# Patient Record
Sex: FEMALE | Race: WHITE | URBAN - METROPOLITAN AREA
[De-identification: names, ages, dates, MRNs, and addresses within clinical notes are randomized per-mention and may not be internally consistent; named-entity substitution may affect disease eponyms.]

---
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
## 2018-05-23 NOTE — PROGRESS NOTES
HPI:   Yanira Styles is a 19year old female who presents with her mother for follow up birth control. Was started on apri in January by a provider at her school.  Attends [employer] but is home for the summer; studying English and desires to go sinus pain or sore throat  LUNGS: denies shortness of breath with exertion  CARDIOVASCULAR: denies chest pain on exertion  GI: denies abdominal pain,denies heartburn  : denies dysuria, vaginal discharge or itching, periods regular   MUSCULOSKELETAL: sd

## 2018-05-25 ENCOUNTER — OFFICE VISIT (OUTPATIENT)
Dept: FAMILY MEDICINE CLINIC | Facility: CLINIC | Age: 18
End: 2018-05-25

## 2018-05-25 VITALS
OXYGEN SATURATION: 98 % | BODY MASS INDEX: 21.66 KG/M2 | DIASTOLIC BLOOD PRESSURE: 76 MMHG | WEIGHT: 130 LBS | RESPIRATION RATE: 16 BRPM | HEIGHT: 65 IN | TEMPERATURE: 98 F | HEART RATE: 75 BPM | SYSTOLIC BLOOD PRESSURE: 112 MMHG

## 2018-05-25 DIAGNOSIS — Z30.41 ENCOUNTER FOR SURVEILLANCE OF CONTRACEPTIVE PILLS: Primary | ICD-10-CM

## 2018-05-25 DIAGNOSIS — Z71.85 IMMUNIZATION COUNSELING: ICD-10-CM

## 2018-05-25 PROCEDURE — 99213 OFFICE O/P EST LOW 20 MIN: CPT | Performed by: PHYSICIAN ASSISTANT

## 2018-05-25 RX ORDER — DESOGESTREL AND ETHINYL ESTRADIOL 0.15-0.03
1 KIT ORAL DAILY
Qty: 3 PACKAGE | Refills: 3 | Status: SHIPPED | OUTPATIENT
Start: 2018-05-25 | End: 2020-01-07

## 2018-05-25 RX ORDER — DESOGESTREL AND ETHINYL ESTRADIOL 0.15-0.03
1 KIT ORAL DAILY
COMMUNITY
End: 2018-05-25

## 2018-05-25 RX ORDER — DESOGESTREL AND ETHINYL ESTRADIOL 0.15-0.03
1 KIT ORAL DAILY
Qty: 3 PACKAGE | Refills: 3 | Status: SHIPPED | OUTPATIENT
Start: 2018-05-25 | End: 2018-05-25

## 2019-08-06 ENCOUNTER — OFFICE VISIT (OUTPATIENT)
Dept: FAMILY MEDICINE CLINIC | Facility: CLINIC | Age: 19
End: 2019-08-06
Payer: COMMERCIAL

## 2019-08-06 VITALS
DIASTOLIC BLOOD PRESSURE: 64 MMHG | RESPIRATION RATE: 16 BRPM | TEMPERATURE: 98 F | HEIGHT: 65 IN | WEIGHT: 141 LBS | SYSTOLIC BLOOD PRESSURE: 104 MMHG | HEART RATE: 84 BPM | OXYGEN SATURATION: 98 % | BODY MASS INDEX: 23.49 KG/M2

## 2019-08-06 DIAGNOSIS — L72.3 SEBACEOUS CYST: Primary | ICD-10-CM

## 2019-08-06 PROCEDURE — 99213 OFFICE O/P EST LOW 20 MIN: CPT | Performed by: FAMILY MEDICINE

## 2019-08-06 NOTE — PROGRESS NOTES
HPI:   Sedrick Perez is a 22year old female who presents with skin concerns  Pt has a skin lesion to left shoulder  Getting bigger   No drainage  Pt has h/o keloids   No facial acne       Current Outpatient Medications:  APRI 0.15-30 MG-MCG Oral Tab Take DERM - INTERNAL      Questions answered and patient indicates understanding of these issues and agrees to the plan. Follow up in 6-12 mo or sooner if needed.

## 2025-03-14 ENCOUNTER — LAB ENCOUNTER (OUTPATIENT)
Dept: LAB | Age: 25
End: 2025-03-14
Attending: FAMILY MEDICINE
Payer: COMMERCIAL

## 2025-03-14 ENCOUNTER — OFFICE VISIT (OUTPATIENT)
Dept: FAMILY MEDICINE CLINIC | Facility: CLINIC | Age: 25
End: 2025-03-14
Payer: COMMERCIAL

## 2025-03-14 VITALS
RESPIRATION RATE: 16 BRPM | SYSTOLIC BLOOD PRESSURE: 122 MMHG | DIASTOLIC BLOOD PRESSURE: 68 MMHG | OXYGEN SATURATION: 98 % | WEIGHT: 165 LBS | HEIGHT: 66 IN | BODY MASS INDEX: 26.52 KG/M2 | HEART RATE: 78 BPM

## 2025-03-14 DIAGNOSIS — R53.83 OTHER FATIGUE: ICD-10-CM

## 2025-03-14 DIAGNOSIS — M26.629 ARTHRALGIA OF TEMPOROMANDIBULAR JOINT, UNSPECIFIED LATERALITY: Primary | ICD-10-CM

## 2025-03-14 DIAGNOSIS — M25.50 MULTIPLE JOINT PAIN: ICD-10-CM

## 2025-03-14 LAB
ALBUMIN SERPL-MCNC: 5.3 G/DL (ref 3.2–4.8)
ALBUMIN/GLOB SERPL: 1.8 {RATIO} (ref 1–2)
ALP LIVER SERPL-CCNC: 108 U/L
ALT SERPL-CCNC: 25 U/L
ANION GAP SERPL CALC-SCNC: 9 MMOL/L (ref 0–18)
AST SERPL-CCNC: 19 U/L (ref ?–34)
BASOPHILS # BLD AUTO: 0.06 X10(3) UL (ref 0–0.2)
BASOPHILS NFR BLD AUTO: 0.7 %
BILIRUB SERPL-MCNC: 0.6 MG/DL (ref 0.3–1.2)
BUN BLD-MCNC: 13 MG/DL (ref 9–23)
CALCIUM BLD-MCNC: 10.1 MG/DL (ref 8.7–10.6)
CHLORIDE SERPL-SCNC: 103 MMOL/L (ref 98–112)
CO2 SERPL-SCNC: 28 MMOL/L (ref 21–32)
CREAT BLD-MCNC: 1.09 MG/DL
CRP SERPL-MCNC: <0.4 MG/DL (ref ?–0.5)
EGFRCR SERPLBLD CKD-EPI 2021: 72 ML/MIN/1.73M2 (ref 60–?)
EOSINOPHIL # BLD AUTO: 0.19 X10(3) UL (ref 0–0.7)
EOSINOPHIL NFR BLD AUTO: 2.1 %
ERYTHROCYTE [DISTWIDTH] IN BLOOD BY AUTOMATED COUNT: 11.9 %
ERYTHROCYTE [SEDIMENTATION RATE] IN BLOOD: 19 MM/HR
FASTING STATUS PATIENT QL REPORTED: NO
GLOBULIN PLAS-MCNC: 2.9 G/DL (ref 2–3.5)
GLUCOSE BLD-MCNC: 75 MG/DL (ref 70–99)
HCT VFR BLD AUTO: 42.9 %
HGB BLD-MCNC: 14.2 G/DL
IMM GRANULOCYTES # BLD AUTO: 0.04 X10(3) UL (ref 0–1)
IMM GRANULOCYTES NFR BLD: 0.5 %
LYMPHOCYTES # BLD AUTO: 2.29 X10(3) UL (ref 1–4)
LYMPHOCYTES NFR BLD AUTO: 25.8 %
MCH RBC QN AUTO: 30 PG (ref 26–34)
MCHC RBC AUTO-ENTMCNC: 33.1 G/DL (ref 31–37)
MCV RBC AUTO: 90.5 FL
MONOCYTES # BLD AUTO: 0.41 X10(3) UL (ref 0.1–1)
MONOCYTES NFR BLD AUTO: 4.6 %
NEUTROPHILS # BLD AUTO: 5.88 X10 (3) UL (ref 1.5–7.7)
NEUTROPHILS # BLD AUTO: 5.88 X10(3) UL (ref 1.5–7.7)
NEUTROPHILS NFR BLD AUTO: 66.3 %
OSMOLALITY SERPL CALC.SUM OF ELEC: 289 MOSM/KG (ref 275–295)
PLATELET # BLD AUTO: 364 10(3)UL (ref 150–450)
POTASSIUM SERPL-SCNC: 3.8 MMOL/L (ref 3.5–5.1)
PROT SERPL-MCNC: 8.2 G/DL (ref 5.7–8.2)
RBC # BLD AUTO: 4.74 X10(6)UL
SODIUM SERPL-SCNC: 140 MMOL/L (ref 136–145)
WBC # BLD AUTO: 8.9 X10(3) UL (ref 4–11)

## 2025-03-14 PROCEDURE — 99203 OFFICE O/P NEW LOW 30 MIN: CPT | Performed by: FAMILY MEDICINE

## 2025-03-14 PROCEDURE — 85652 RBC SED RATE AUTOMATED: CPT | Performed by: FAMILY MEDICINE

## 2025-03-14 PROCEDURE — 85025 COMPLETE CBC W/AUTO DIFF WBC: CPT | Performed by: FAMILY MEDICINE

## 2025-03-14 PROCEDURE — 86140 C-REACTIVE PROTEIN: CPT | Performed by: FAMILY MEDICINE

## 2025-03-14 PROCEDURE — 3074F SYST BP LT 130 MM HG: CPT | Performed by: FAMILY MEDICINE

## 2025-03-14 PROCEDURE — 3008F BODY MASS INDEX DOCD: CPT | Performed by: FAMILY MEDICINE

## 2025-03-14 PROCEDURE — 3078F DIAST BP <80 MM HG: CPT | Performed by: FAMILY MEDICINE

## 2025-03-14 PROCEDURE — 80053 COMPREHEN METABOLIC PANEL: CPT | Performed by: FAMILY MEDICINE

## 2025-03-14 RX ORDER — BUPROPION HYDROCHLORIDE 300 MG/1
300 TABLET ORAL DAILY
COMMUNITY

## 2025-03-14 RX ORDER — SPIRONOLACTONE 100 MG/1
100 TABLET, FILM COATED ORAL DAILY
COMMUNITY

## 2025-03-14 RX ORDER — ESCITALOPRAM OXALATE 20 MG/1
20 TABLET ORAL DAILY
COMMUNITY

## 2025-03-14 NOTE — PROGRESS NOTES
[facility] Progress Note    SUBJECTIVE: Arelis Lorenzo 24 year old female is here today for   Chief Complaint   Patient presents with    Tmj Disorder     Has had it since high school-has gotten worse -needs help on tx     Pain     Chronic pain-has gone for PT and they think she should get tested for underline dx such as auto immune, fibromyalgia. Pa Danlos     Anxiety     Which she feels making TMJ and Anxiety as well as migraines        [place], but transferring care here where mom lives.    IN the past year or so, starting to get more pain, and almost constantly. Feeling in all of her joints, easily spraing her joints. Ankle sprains maybe 7 times in past 2-3  years.    Bad TMJ, gets migraines.    Sleep is bad.    No significant evaluation      Has done TMJ therapy, and did masseter botox.    Works as an , long hours  In first year, 60-90 hours a week on build up, vs othr times.    Has all ove rjoint pain, feels like injures herself often, and then will get swelling, but most cmmon places are ankles, hips, ribs, wrists, and sometimes knees and neck.    Swelling isn't usual prior to pain    Does have mobility in joint, possibly hypermobility.    Not currently struggling with anxiety, doing better on medications, started in law school. Had started on prozac, and feels like it is helping.    Had been taking spironolactone for acne, so far no acne returned.    Struggles with any pushing type work, and compression on wrists.    Migraines, hard to know when typical headache vs migraine. Thinks stress induced. Does get light sensitivity, will wear sunglasses and turn out lights, works through it, if she can will sleep    PMH  Past Medical History:    Kate Inserted        PSH  No past surgical history on file.     Social Hx:  Lives with her sister, [place]  Mostly works, reads, and goes to do park.  Trying to do yoga    ROS  See HPI    OBJECTIVE:  /68   Pulse 78   Resp 16    Ht 5' 6\" (1.676 m)   Wt 165 lb (74.8 kg)   LMP 03/05/2025 (Exact Date)   SpO2 98%   BMI 26.63 kg/m²     Exam  Gen: No acute distress, alert and oriented x3, no focal neurologic deficits  ENT: PERRLA, EOMI, TM clear  CV: RRR, s1 and s2 present, no murmurs clicks or rubs  Resp: clear to auscultation bilaterally  Abd: BS+, no organomegaly or palpable abnormality  Ext:No edema, distal pulses intact upper and lower bilaterally  Skin:no rashes or lesions      Labs:          Meds:   Current Outpatient Medications   Medication Sig Dispense Refill    buPROPion  MG Oral Tablet 24 Hr Take 1 tablet (300 mg total) by mouth daily.      escitalopram 20 MG Oral Tab Take 1 tablet (20 mg total) by mouth daily.      spironolactone 100 MG Oral Tab Take 1 tablet (100 mg total) by mouth daily.           Assessment/Plan  Arelis was seen today for tmj disorder, pain and anxiety.    Diagnoses and all orders for this visit:    Arthralgia of temporomandibular joint, unspecified laterality  -     Oral Surgery Referral - In Network    Multiple joint pain  -     CBC W Differential W Platelet [E]; Future  -     C-Reactive Protein [E]; Future  -     Sed Rate, Westergren (Automated) [E]; Future  -     Comp Metabolic Panel (14) [E]; Future    Other fatigue  -     Oral Surgery Referral - In Network  -     CBC W Differential W Platelet [E]; Future  -     C-Reactive Protein [E]; Future  -     Sed Rate, Westergren (Automated) [E]; Future  -     Comp Metabolic Panel (14) [E]; Future       Has done extensive treatment and work up for TMJ prior so will recommend specialist consult to start.     Will get labs to look into possible causes of symptoms, if negative, consider rheum consult to rule out connective tissue disease/fibromyalgia        Total Time spent with patient and coordinating care:  25 minutes.    Follow up: after labs, will contact with josé miguel Pelaez MD

## 2025-04-06 ENCOUNTER — PATIENT MESSAGE (OUTPATIENT)
Dept: FAMILY MEDICINE CLINIC | Facility: CLINIC | Age: 25
End: 2025-04-06

## 2025-04-08 RX ORDER — SPIRONOLACTONE 100 MG/1
100 TABLET, FILM COATED ORAL DAILY
Qty: 90 TABLET | Refills: 0 | Status: SHIPPED | OUTPATIENT
Start: 2025-04-08

## 2025-04-08 RX ORDER — ESCITALOPRAM OXALATE 20 MG/1
20 TABLET ORAL DAILY
Qty: 90 TABLET | Refills: 0 | Status: CANCELLED | OUTPATIENT
Start: 2025-04-08

## 2025-04-08 RX ORDER — BUPROPION HYDROCHLORIDE 300 MG/1
300 TABLET ORAL DAILY
Qty: 90 TABLET | Refills: 0 | Status: SHIPPED | OUTPATIENT
Start: 2025-04-08

## 2025-04-08 RX ORDER — ESCITALOPRAM OXALATE 20 MG/1
20 TABLET ORAL DAILY
Qty: 90 TABLET | Refills: 0 | Status: SHIPPED | OUTPATIENT
Start: 2025-04-08

## 2025-04-08 NOTE — TELEPHONE ENCOUNTER
Medications: NEW PATIENT  Current Outpatient Medications on File    buPROPion 300 mg XL tablet TAKE 1 TABLET BY MOUTH EVERY MORNING    escitalopram oxalate (LEXAPRO) 20 mg tablet Take 1 tablet by mouth daily.      spironolactone 100 mg tablet Take 1 tablet by mouth daily.      LV:3/14/2025

## 2025-04-08 NOTE — TELEPHONE ENCOUNTER
Approve/deny? Pt is new to our office. Last ov 3/14/25.  She has not taken any med since 3/21/25, okay to restart?

## 2025-08-05 RX ORDER — ESCITALOPRAM OXALATE 20 MG/1
20 TABLET ORAL DAILY
Qty: 90 TABLET | Refills: 0 | Status: SHIPPED | OUTPATIENT
Start: 2025-08-05

## 2025-08-05 RX ORDER — SPIRONOLACTONE 100 MG/1
100 TABLET, FILM COATED ORAL DAILY
Qty: 90 TABLET | Refills: 0 | Status: SHIPPED | OUTPATIENT
Start: 2025-08-05

## 2025-08-05 RX ORDER — BUPROPION HYDROCHLORIDE 300 MG/1
300 TABLET ORAL DAILY
Qty: 90 TABLET | Refills: 0 | Status: SHIPPED | OUTPATIENT
Start: 2025-08-05

## (undated) NOTE — MR AVS SNAPSHOT
[facility] [address] [facility]  [place] [address]  [phone]               Thank you for choosing us for your health care visit with Ines Hugo PA-C. We are glad to serve you and happy to provide you with this summary of your visit.   Glo If you have questions, you can call [phone] to talk to our [facility] Staff. Remember, [email or web address] is NOT to be used for urgent needs. For medical emergencies, dial 911.            Visit [facility] online at  [email or web address]